# Patient Record
Sex: FEMALE | Race: WHITE | NOT HISPANIC OR LATINO
[De-identification: names, ages, dates, MRNs, and addresses within clinical notes are randomized per-mention and may not be internally consistent; named-entity substitution may affect disease eponyms.]

---

## 2017-07-20 PROBLEM — Z00.00 ENCOUNTER FOR PREVENTIVE HEALTH EXAMINATION: Status: ACTIVE | Noted: 2017-07-20

## 2017-07-21 ENCOUNTER — APPOINTMENT (OUTPATIENT)
Dept: ENDOCRINOLOGY | Facility: CLINIC | Age: 47
End: 2017-07-21

## 2017-07-21 VITALS
HEART RATE: 107 BPM | WEIGHT: 150 LBS | DIASTOLIC BLOOD PRESSURE: 82 MMHG | BODY MASS INDEX: 23.54 KG/M2 | OXYGEN SATURATION: 98 % | HEIGHT: 67 IN | SYSTOLIC BLOOD PRESSURE: 120 MMHG

## 2017-07-21 DIAGNOSIS — Z78.9 OTHER SPECIFIED HEALTH STATUS: ICD-10-CM

## 2017-07-21 DIAGNOSIS — Z87.19 PERSONAL HISTORY OF OTHER DISEASES OF THE DIGESTIVE SYSTEM: ICD-10-CM

## 2017-07-21 DIAGNOSIS — Z83.49 FAMILY HISTORY OF OTHER ENDOCRINE, NUTRITIONAL AND METABOLIC DISEASES: ICD-10-CM

## 2017-07-21 RX ORDER — NORETHINDRONE ACETATE AND ETHINYL ESTRADIOL, ETHINYL ESTRADIOL AND FERROUS FUMARATE 1MG-10(24)
1 MG-10 MCG / KIT ORAL
Qty: 84 | Refills: 0 | Status: ACTIVE | COMMUNITY
Start: 2017-03-10

## 2017-09-11 ENCOUNTER — LABORATORY RESULT (OUTPATIENT)
Age: 47
End: 2017-09-11

## 2017-09-11 ENCOUNTER — RESULT REVIEW (OUTPATIENT)
Age: 47
End: 2017-09-11

## 2017-09-11 ENCOUNTER — APPOINTMENT (OUTPATIENT)
Dept: SURGERY | Facility: CLINIC | Age: 47
End: 2017-09-11
Payer: COMMERCIAL

## 2017-09-11 VITALS
DIASTOLIC BLOOD PRESSURE: 75 MMHG | SYSTOLIC BLOOD PRESSURE: 126 MMHG | BODY MASS INDEX: 21.98 KG/M2 | HEIGHT: 68 IN | WEIGHT: 145 LBS | HEART RATE: 82 BPM

## 2017-09-11 PROCEDURE — 76942 ECHO GUIDE FOR BIOPSY: CPT | Mod: 59

## 2017-09-11 PROCEDURE — 99244 OFF/OP CNSLTJ NEW/EST MOD 40: CPT | Mod: 25

## 2017-09-11 PROCEDURE — 10022: CPT

## 2017-09-26 ENCOUNTER — TRANSCRIPTION ENCOUNTER (OUTPATIENT)
Age: 47
End: 2017-09-26

## 2017-12-17 ENCOUNTER — FORM ENCOUNTER (OUTPATIENT)
Age: 47
End: 2017-12-17

## 2017-12-18 ENCOUNTER — APPOINTMENT (OUTPATIENT)
Dept: ULTRASOUND IMAGING | Facility: IMAGING CENTER | Age: 47
End: 2017-12-18

## 2017-12-18 ENCOUNTER — APPOINTMENT (OUTPATIENT)
Dept: SURGERY | Facility: CLINIC | Age: 47
End: 2017-12-18
Payer: COMMERCIAL

## 2017-12-18 ENCOUNTER — OUTPATIENT (OUTPATIENT)
Dept: OUTPATIENT SERVICES | Facility: HOSPITAL | Age: 47
LOS: 1 days | End: 2017-12-18
Payer: COMMERCIAL

## 2017-12-18 DIAGNOSIS — Z00.8 ENCOUNTER FOR OTHER GENERAL EXAMINATION: ICD-10-CM

## 2017-12-18 PROCEDURE — 76536 US EXAM OF HEAD AND NECK: CPT | Mod: 26

## 2017-12-18 PROCEDURE — 76536 US EXAM OF HEAD AND NECK: CPT

## 2017-12-18 PROCEDURE — 99214 OFFICE O/P EST MOD 30 MIN: CPT

## 2018-01-05 ENCOUNTER — APPOINTMENT (OUTPATIENT)
Dept: ENDOCRINOLOGY | Facility: CLINIC | Age: 48
End: 2018-01-05
Payer: COMMERCIAL

## 2018-01-05 VITALS — HEART RATE: 73 BPM | SYSTOLIC BLOOD PRESSURE: 120 MMHG | DIASTOLIC BLOOD PRESSURE: 68 MMHG | OXYGEN SATURATION: 97 %

## 2018-01-05 PROCEDURE — 99213 OFFICE O/P EST LOW 20 MIN: CPT

## 2018-03-02 ENCOUNTER — CLINICAL ADVICE (OUTPATIENT)
Age: 48
End: 2018-03-02

## 2018-06-01 ENCOUNTER — APPOINTMENT (OUTPATIENT)
Dept: ENDOCRINOLOGY | Facility: CLINIC | Age: 48
End: 2018-06-01
Payer: COMMERCIAL

## 2018-06-01 VITALS
OXYGEN SATURATION: 98 % | DIASTOLIC BLOOD PRESSURE: 70 MMHG | BODY MASS INDEX: 23.04 KG/M2 | SYSTOLIC BLOOD PRESSURE: 110 MMHG | HEART RATE: 80 BPM | HEIGHT: 68 IN | WEIGHT: 152 LBS

## 2018-06-01 DIAGNOSIS — D49.7 NEOPLASM OF UNSPECIFIED BEHAVIOR OF ENDOCRINE GLANDS AND OTHER PARTS OF NERVOUS SYSTEM: ICD-10-CM

## 2018-06-01 PROCEDURE — 99213 OFFICE O/P EST LOW 20 MIN: CPT

## 2019-01-07 ENCOUNTER — TRANSCRIPTION ENCOUNTER (OUTPATIENT)
Age: 49
End: 2019-01-07

## 2019-01-18 ENCOUNTER — TRANSCRIPTION ENCOUNTER (OUTPATIENT)
Age: 49
End: 2019-01-18

## 2019-01-25 ENCOUNTER — TRANSCRIPTION ENCOUNTER (OUTPATIENT)
Age: 49
End: 2019-01-25

## 2019-01-31 ENCOUNTER — LABORATORY RESULT (OUTPATIENT)
Age: 49
End: 2019-01-31

## 2019-01-31 ENCOUNTER — APPOINTMENT (OUTPATIENT)
Dept: ENDOCRINOLOGY | Facility: CLINIC | Age: 49
End: 2019-01-31
Payer: COMMERCIAL

## 2019-01-31 VITALS
OXYGEN SATURATION: 98 % | WEIGHT: 152 LBS | HEIGHT: 68 IN | SYSTOLIC BLOOD PRESSURE: 120 MMHG | DIASTOLIC BLOOD PRESSURE: 80 MMHG | BODY MASS INDEX: 23.04 KG/M2 | HEART RATE: 91 BPM

## 2019-01-31 PROCEDURE — 10005 FNA BX W/US GDN 1ST LES: CPT

## 2019-01-31 PROCEDURE — 99213 OFFICE O/P EST LOW 20 MIN: CPT | Mod: 25

## 2019-01-31 PROCEDURE — 76536 US EXAM OF HEAD AND NECK: CPT | Mod: 52

## 2019-01-31 NOTE — HISTORY OF PRESENT ILLNESS
[FreeTextEntry1] : 48 year old female for follow up of thyroid nodule. Previous patient of Dr. Solis \par She had a FNA that was consistent with AUS and positive genetic mutation in June and Sep 2017 NRAS positive\par She never had RT to her neck\par She had normal thyroid function tests and positive thyroid antibodies in July 2017\par She had seen the surgeon but stanley snot feel ready for the surgery\par \par Today her nodule is 2.18 x 1.22 x 1.77 cm \par Appearance is solid, hypoechoic, microcalcifications with a thick halo, high suspicion nodule \par No family history of thyroid cancer

## 2019-01-31 NOTE — PHYSICAL EXAM
[Alert] : alert [No Acute Distress] : no acute distress [Well Nourished] : well nourished [Well Developed] : well developed [Normal Sclera/Conjunctiva] : normal sclera/conjunctiva [PERRL] : pupils equal, round and reactive to light [EOMI] : extra ocular movement intact [No Proptosis] : no proptosis [Normal Outer Ear/Nose] : the ears and nose were normal in appearance [Normal TMs] : both tympanic membranes were normal [Normal Hearing] : hearing was normal [No Neck Mass] : no neck mass was observed [Supple] : the neck was supple [Thyroid Not Enlarged] : the thyroid was not enlarged [No Respiratory Distress] : no respiratory distress [Normal Rate and Effort] : normal respiratory rhythm and effort [No Accessory Muscle Use] : no accessory muscle use [Clear to Auscultation] : lungs were clear to auscultation bilaterally [Normal S1, S2] : normal S1 and S2 [Regular Rhythm] : with a regular rhythm [Normal Bowel Sounds] : normal bowel sounds [Not Tender] : non-tender [Soft] : abdomen soft [Not Distended] : not distended [Normal] : normal and non tender [No CVA Tenderness] : no ~M costovertebral angle tenderness [No Spinal Tenderness] : no spinal tenderness [Normal Gait] : normal gait [No Joint Swelling] : no joint swelling seen [No Clubbing, Cyanosis] : no clubbing  or cyanosis of the fingernails [Normal Strength/Tone] : muscle strength and tone were normal [No Rash] : no rash [No Skin Lesions] : no skin lesions [Normal Reflexes] : deep tendon reflexes were 2+ and symmetric [No Motor Deficits] : the motor exam was normal [No Tremors] : no tremors [Oriented x3] : oriented to person, place, and time [Normal Insight/Judgement] : insight and judgment were intact [Normal Affect] : the affect was normal [Normal Mood] : the mood was normal [Kyphosis] : no kyphosis present [Scoliosis] : scoliosis not present [Foot Ulcers] : no foot ulcers [Acne] : no acne

## 2019-01-31 NOTE — ASSESSMENT
[FreeTextEntry1] : 48 year old female with history of thyroid nodule here for follow up \par -clinically euthyroid\par -Thyroid US was done and she has a 2.18 by 1.5 cm right thyroid nodule it is complex with cystic area and possible microcalcification it is is isoechoic\par -FNA june and sept 2017 were AUS with positive genetic marker\par -At this time, will await results and have a discussion with the patient \par -Follow up in 6 months

## 2019-01-31 NOTE — PROCEDURE
[Fine Needle Aspiration] : fine needle aspiration ~T ~C was performed [Area of Mass: ______] : mass identified in the [unfilled] [Patient] : the patient [Risks] : risks [Benefits] : benefits [Consent Obtained] : written consent was obtained prior to the procedure and is detailed in the patient's record [25 gauge 1.5 inch] : A 25 gauge 1.5 inch needle was used [Ultrasonic Guidance] : ultrasound guidance was employed [____ Passes] : [unfilled] passes were made through the mass [Sent to Histology] : the specimens were prepared in the usual manner and sent to cytopathologist [Tolerated Well] : the patient tolerated the procedure well [Vital Signs Stable] : the vital signs were stable [de-identified] : Saved for affirma  [FreeTextEntry1] : 48 year old female with 2.18 cm thyroid nodule s/p successful biopsy\par \par -sample saved for affirma \par -Will follow up on results\par -Will be called back

## 2019-01-31 NOTE — REVIEW OF SYSTEMS
[Fatigue] : no fatigue [Decreased Appetite] : appetite not decreased [Recent Weight Gain (___ Lbs)] : no recent weight gain [Recent Weight Loss (___ Lbs)] : no recent weight loss [Visual Field Defect] : no visual field defect [Blurry Vision] : no blurred vision [Dry Eyes] : no dryness of the eyes [Eyes Itch] : no itching of the eyes [Dysphagia] : no dysphagia [Dysphonia] : no dysphonia [Neck Pain] : no neck pain [Nasal Congestion] : no nasal congestion [Chest Pain] : no chest pain [Palpitations] : no palpitations [Heart Rate Is Slow] : the heart rate was not slow [Heart Rate Is Fast] : the heart rate was not fast [Shortness Of Breath] : no shortness of breath [Wheezing] : no wheezing was heard [Cough] : no cough [SOB on Exertion] : no shortness of breath during exertion [Nausea] : no nausea [Vomiting] : no vomiting was observed [Constipation] : no constipation [Diarrhea] : no diarrhea [Polyuria] : no polyuria [Irregular Menses] : regular menses [Joint Pain] : no joint pain [Joint Stiffness] : no joint stiffness [Muscle Weakness] : no muscle weakness [Muscle Cramps] : no muscle cramps [Acanthosis] : no acanthosis  [Hirsutism] : no hirsutism [Acne] : no acne [Hair Loss] : no hair loss [Headache] : no headaches [Tremors] : no tremors [Depression] : no depression [Anxiety] : no anxiety [Polydipsia] : no polydipsia [Galactorrhea] : no galactorrhea  [Cold Intolerance] : cold tolerant [Heat Intolerance] : heat tolerant [Easy Bleeding] : no ~M tendency for easy bleeding [Easy Bruising] : no tendency for easy bruising [Swelling] : no swelling

## 2019-01-31 NOTE — PROCEDURE
[Fine Needle Aspiration] : fine needle aspiration ~T ~C was performed [Area of Mass: ______] : mass identified in the [unfilled] [Patient] : the patient [Risks] : risks [Benefits] : benefits [Consent Obtained] : written consent was obtained prior to the procedure and is detailed in the patient's record [25 gauge 1.5 inch] : A 25 gauge 1.5 inch needle was used [Ultrasonic Guidance] : ultrasound guidance was employed [____ Passes] : [unfilled] passes were made through the mass [Sent to Histology] : the specimens were prepared in the usual manner and sent to cytopathologist [Tolerated Well] : the patient tolerated the procedure well [Vital Signs Stable] : the vital signs were stable [de-identified] : Saved for affirma  [FreeTextEntry1] : 48 year old female with 2.18 cm thyroid nodule s/p successful biopsy\par \par -sample saved for affirma \par -Will follow up on results\par -Will be called back

## 2019-02-04 ENCOUNTER — TRANSCRIPTION ENCOUNTER (OUTPATIENT)
Age: 49
End: 2019-02-04

## 2019-02-19 ENCOUNTER — OTHER (OUTPATIENT)
Age: 49
End: 2019-02-19

## 2019-03-01 ENCOUNTER — TRANSCRIPTION ENCOUNTER (OUTPATIENT)
Age: 49
End: 2019-03-01

## 2019-03-29 ENCOUNTER — APPOINTMENT (OUTPATIENT)
Dept: ENDOCRINOLOGY | Facility: CLINIC | Age: 49
End: 2019-03-29

## 2019-04-04 ENCOUNTER — APPOINTMENT (OUTPATIENT)
Dept: SURGERY | Facility: CLINIC | Age: 49
End: 2019-04-04
Payer: COMMERCIAL

## 2019-04-04 VITALS
HEIGHT: 68 IN | SYSTOLIC BLOOD PRESSURE: 154 MMHG | BODY MASS INDEX: 22.28 KG/M2 | WEIGHT: 147 LBS | HEART RATE: 97 BPM | DIASTOLIC BLOOD PRESSURE: 82 MMHG

## 2019-04-04 DIAGNOSIS — E04.1 NONTOXIC SINGLE THYROID NODULE: ICD-10-CM

## 2019-04-04 PROCEDURE — 36415 COLL VENOUS BLD VENIPUNCTURE: CPT

## 2019-04-04 PROCEDURE — 99244 OFF/OP CNSLTJ NEW/EST MOD 40: CPT

## 2019-04-05 LAB
T3 SERPL-MCNC: 148 NG/DL
T4 FREE SERPL-MCNC: 1.1 NG/DL
THYROGLOB AB SERPL-ACNC: <20 IU/ML
THYROPEROXIDASE AB SERPL IA-ACNC: <10 IU/ML
TSH SERPL-ACNC: 0.96 UIU/ML

## 2019-04-05 NOTE — ASSESSMENT
[FreeTextEntry1] : lengthy discussion regarding options for management including  thyroid lobectomy with paratracheal node dissection, with or without frozen section vs total thyroidectomy with paratracheal node dissection. risks, benefits and alternatives discussed at length.  recommended lobectomy. to be scheduled at [facility]\par

## 2019-04-05 NOTE — PHYSICAL EXAM
[de-identified] : 2 cm right thyroid nodule, well circumscribed and mobile [Nasal Endoscopy Performed] : nasal endoscopy was performed, see procedure section for findings [Laryngoscopy Performed] : laryngoscopy was performed, see procedure section for findings [Midline] : located in midline position [Normal] : orientation to person, place, and time: normal [de-identified] : fiberoptic laryngoscopy shows normal vocal cord mobility bilaterally with no lesions noted

## 2019-04-05 NOTE — REASON FOR VISIT
[Initial Consultation] : an initial consultation for [FreeTextEntry2] : Thyroid nodule [Spouse] : spouse

## 2019-04-05 NOTE — CONSULT LETTER
[Dear  ___] : Dear  [unfilled], [Consult Letter:] : I had the pleasure of evaluating your patient, [unfilled]. [Please see my note below.] : Please see my note below. [Consult Closing:] : Thank you very much for allowing me to participate in the care of this patient.  If you have any questions, please do not hesitate to contact me. [Sincerely,] : Sincerely, [FreeTextEntry2] : Dr. Fortino Dalton [FreeTextEntry3] : Sanya Orona MD, FACS\par System Director, Endocrine Surgery\par [facility]\par

## 2019-04-05 NOTE — HISTORY OF PRESENT ILLNESS
[de-identified] : Pt c/o Thyroid nodule for several years.  denies dysphagia, hoarseness, SOB or RT exposure\par sonogram:  Right 2.18 cm thyroid nodule\par FNA:  AUS,  NRAS positive

## 2019-04-09 ENCOUNTER — OTHER (OUTPATIENT)
Age: 49
End: 2019-04-09

## 2019-07-29 ENCOUNTER — APPOINTMENT (OUTPATIENT)
Dept: ENDOCRINOLOGY | Facility: CLINIC | Age: 49
End: 2019-07-29

## 2019-09-13 ENCOUNTER — APPOINTMENT (OUTPATIENT)
Dept: ORTHOPEDIC SURGERY | Facility: CLINIC | Age: 49
End: 2019-09-13
Payer: COMMERCIAL

## 2019-09-13 VITALS — BODY MASS INDEX: 22.28 KG/M2 | HEIGHT: 68 IN | WEIGHT: 147 LBS

## 2019-09-13 DIAGNOSIS — M54.12 RADICULOPATHY, CERVICAL REGION: ICD-10-CM

## 2019-09-13 PROCEDURE — 72040 X-RAY EXAM NECK SPINE 2-3 VW: CPT

## 2019-09-13 PROCEDURE — 99204 OFFICE O/P NEW MOD 45 MIN: CPT

## 2019-09-13 NOTE — HISTORY OF PRESENT ILLNESS
[de-identified] : Patient is here for neck pain that began on 9/10/19 when she woke up in the morning. The pain starts in her neck and radiates down into her hands. She currently had N/T into her first digit. She states that the pain is sharp and is worse when she is sedentary. She was carrying heavy objects for a long period of time on Sunday but does not recall any injury and did not feel this pain on Sunday or Monday. She went to an urgent care where she was put on Prednisone, Diclofenac, and Flexeril which has not provided relief. Her pain is affecting her sleep. Denies prior injury. \par \par The patient's past medical history, past surgical history, medications and allergies were reviewed by me today and documented accordingly. In addition, the patient's family and social history, which were noncontributory to this visit, were reviewed also. The patient has no family history of arthritis.

## 2019-09-13 NOTE — PHYSICAL EXAM
[de-identified] : Constitutional: Well-nourished, well-developed, No acute distress\par Respiratory:  Good respiratory effort, no SOB\par Lymphatic: No regional lymphadenopathy, no lymphedema\par Psychiatric: Pleasant and normal affect, alert and oriented x3\par Skin: Clean dry and intact B/L UE/LE\par Musculoskeletal: normal except where as noted in regional exam\par \par Cervical Spine Exam\par APPEARANCE: no marked deformities or malalignment, normal curvature, good posture\par POSITIVE TENDERNESS: + Bilateral upper trapezius, levator scapula, rhomboid major, and rhomboid minor, + spasm noted in the same muscles.\par NONTENDER: no bony midline tenderness.\par ROM: limited in all planes, most notably in flexion and sidebending due to pain\par RESISTIVE TESTING: painful 4/5 resisted ext, bilateral sidebending, rotation and shoulder shrug , 5/5 flexion \par SPECIAL TESTS: neg Spurling's b/l\par Vasc: 2+ radial pulse b/l\par Neuro: C5 - T1 intact to motor, DTRs 2+/4 biceps, triceps, brachioradialis\par Sensation: Intact to light touch throughout b/l UE\par Thoracic spine:  normal curvature and normal alignment. good posture. no midline bony tenderness, no marked spasm. no marked tenderness in paraspinal muscles.  ROM full & painless all planes\par \par B/L Shoulders:  No asymmetry, malalignment, or swelling, Full ROM, 5/5 strength in flexion/ext, Abd/Add, IR/ER, Joints stable\par B/L Elbows:  No asymmetry, malalignment, or swelling, Full ROM, 5/5 strength in flexion/ext, pronation/supination, Joints stable\par B/L Wrist and Hand:  No asymmetry, malalignment, or swelling, Full ROM, 5/5 strength in wrist and long finger flexion/ext, radial/ulnar deviation, Joints stable\par \par  [de-identified] : The following radiographs were ordered and read by me during this patient's visit. I reviewed each radiograph in detail with the patient and discussed the findings as highlighted below. \par \par 2 views of the C-spine were obtained today that show degenerative changes and evidence of mild osteoarthritis.  No fracture, or dislocation seen at this time. There is no significant malalignment but there is straightening abnormal curvature, and grade 1 anterolisthesis of C7 on T1. No other obvious osseous abnormality. Otherwise unremarkable.

## 2019-09-13 NOTE — DISCUSSION/SUMMARY
[de-identified] : Discussed findings of today's exam and possible causes of patient's pain.  Educated patient on their most probable diagnosis of acute cervical pain with likely radiculopathy.  Reviewed possible courses of treatment, and we collaboratively decided best course of treatment at this time will include conservative management. Patient is currently taking prednisone and cyclobenzaprine as prescribed by urgent care, she'll continue these medications. At this time recommend advance imaging with MRI the cervical spine to evaluate for annular tear versus herniation. If found, patient likely benefit from physiatry consultation for epidural steroid injection. Patient will follow up on MRI results are available.  Patient appreciates and agrees with current plan.\par \par This note was generated using dragon medical dictation software.  A reasonable effort has been made for proofreading its contents, but typos may still remain.  If there are any questions or points of clarification needed please notify my office.

## 2019-09-16 ENCOUNTER — CLINICAL ADVICE (OUTPATIENT)
Age: 49
End: 2019-09-16

## 2019-09-16 RX ORDER — PREDNISONE 5 MG/1
5 TABLET ORAL
Qty: 30 | Refills: 0 | Status: ACTIVE | COMMUNITY
Start: 2019-09-16 | End: 1900-01-01

## 2019-09-18 ENCOUNTER — CLINICAL ADVICE (OUTPATIENT)
Age: 49
End: 2019-09-18

## 2019-09-18 ENCOUNTER — OTHER (OUTPATIENT)
Age: 49
End: 2019-09-18

## 2019-09-24 ENCOUNTER — OTHER (OUTPATIENT)
Age: 49
End: 2019-09-24

## 2019-09-30 ENCOUNTER — OTHER (OUTPATIENT)
Age: 49
End: 2019-09-30

## 2020-01-02 ENCOUNTER — OTHER (OUTPATIENT)
Age: 50
End: 2020-01-02

## 2020-01-19 ENCOUNTER — FORM ENCOUNTER (OUTPATIENT)
Age: 50
End: 2020-01-19

## 2020-01-20 ENCOUNTER — APPOINTMENT (OUTPATIENT)
Dept: ULTRASOUND IMAGING | Facility: CLINIC | Age: 50
End: 2020-01-20
Payer: COMMERCIAL

## 2020-01-20 ENCOUNTER — OUTPATIENT (OUTPATIENT)
Dept: OUTPATIENT SERVICES | Facility: HOSPITAL | Age: 50
LOS: 1 days | End: 2020-01-20
Payer: COMMERCIAL

## 2020-01-20 DIAGNOSIS — E04.1 NONTOXIC SINGLE THYROID NODULE: ICD-10-CM

## 2020-01-20 PROCEDURE — 76536 US EXAM OF HEAD AND NECK: CPT | Mod: 26

## 2020-01-20 PROCEDURE — 76536 US EXAM OF HEAD AND NECK: CPT

## 2020-01-23 ENCOUNTER — OTHER (OUTPATIENT)
Age: 50
End: 2020-01-23

## 2021-06-30 ENCOUNTER — TRANSCRIPTION ENCOUNTER (OUTPATIENT)
Age: 51
End: 2021-06-30

## 2021-11-08 ENCOUNTER — TRANSCRIPTION ENCOUNTER (OUTPATIENT)
Age: 51
End: 2021-11-08

## 2023-02-08 ENCOUNTER — OFFICE (OUTPATIENT)
Dept: URBAN - METROPOLITAN AREA CLINIC 35 | Facility: CLINIC | Age: 53
Setting detail: OPHTHALMOLOGY
End: 2023-02-08
Payer: COMMERCIAL

## 2023-02-08 DIAGNOSIS — H00.15: ICD-10-CM

## 2023-02-08 PROCEDURE — 99213 OFFICE O/P EST LOW 20 MIN: CPT | Performed by: OPHTHALMOLOGY

## 2023-02-08 ASSESSMENT — AXIALLENGTH_DERIVED
OD_AL: 23.6757
OS_AL: 23.4357

## 2023-02-08 ASSESSMENT — REFRACTION_AUTOREFRACTION
OD_CYLINDER: +1.25
OD_SPHERE: -0.25
OS_CYLINDER: +1.25
OD_AXIS: 175
OS_SPHERE: +0.25
OS_AXIS: 010

## 2023-02-08 ASSESSMENT — KERATOMETRY
OS_K2POWER_DIOPTERS: 43.00
OD_AXISANGLE_DEGREES: 003
OD_K2POWER_DIOPTERS: 43.25
OD_K1POWER_DIOPTERS: 42.50
OS_AXISANGLE_DEGREES: 090
OS_K1POWER_DIOPTERS: 43.00

## 2023-02-08 ASSESSMENT — SPHEQUIV_DERIVED
OD_SPHEQUIV: 0.375
OS_SPHEQUIV: 0.875

## 2023-02-08 ASSESSMENT — LID EXAM ASSESSMENTS: OS_EDEMA: LLL 1+

## 2023-02-08 ASSESSMENT — VISUAL ACUITY
OS_BCVA: 20/25-2
OD_BCVA: 20/20-

## 2023-02-08 ASSESSMENT — CONFRONTATIONAL VISUAL FIELD TEST (CVF)
OD_FINDINGS: FULL
OS_FINDINGS: FULL

## 2023-04-21 NOTE — ASSESSMENT
Scheduled procedure with Patient at      Telephone Information:   Mobile [phone]      Scheduled Via: Case Request Order for  [facility]  Did patient request a different provider then the referred to:No  Procedure date: 8.25.23   Procedure time: TBD ; Did patient request this specific time? No    -If a MAC pt is scheduled, pt was notified a family member/friend is need to stay with the patient over night after their procedure: No              Notified patient about receiving an animated Racheal program? No    Was letter mailed Yes  Was the letter sent thru [employer]? No. If yes was the patient to to look under letters for prep instructions?  No      The following have been confirmed:  Insurance name confirmed as [facility], will be the same at time of procedure?: Yes Ins Accepted at Facility? Yes  Latex Allergy No  Diabetic No  Sleep Apnea No if yes CPAP  No  Diuretic/Water pill No  Defibrillator/Pacemaker No  MRSA hx No  On Blood thinners and told to hold : Coumadin (Warfarin) or Plavix No   Phentermine (diet pill) No      Prep required? Yes, Pharmacy is [facility] in [place] ; was request sent to nurse to send prep to pharmacy? Yes; Briefly reviewed? Yes;   If procedure is scheduled 7 days or less, patient was told to  prep letter?: No  Prep Letters sent to patients home address. Home address was verified.     [FreeTextEntry1] : 48 year old female with history of thyroid nodule here for follow up \par -clinically euthyroid\par -Thyroid US was done and she has a 2.18 by 1.5 cm right thyroid nodule it is complex with cystic area and possible microcalcification it is is isoechoic\par -FNA june and sept 2017 were AUS with positive genetic marker\par -At this time, will await results and have a discussion with the patient \par -Follow up in 6 months

## 2023-11-11 ENCOUNTER — NON-APPOINTMENT (OUTPATIENT)
Age: 53
End: 2023-11-11